# Patient Record
Sex: FEMALE | Race: WHITE | NOT HISPANIC OR LATINO | URBAN - METROPOLITAN AREA
[De-identification: names, ages, dates, MRNs, and addresses within clinical notes are randomized per-mention and may not be internally consistent; named-entity substitution may affect disease eponyms.]

---

## 2022-03-02 ENCOUNTER — EMERGENCY (EMERGENCY)
Facility: HOSPITAL | Age: 77
LOS: 1 days | Discharge: HOME | End: 2022-03-02
Attending: EMERGENCY MEDICINE | Admitting: EMERGENCY MEDICINE
Payer: MEDICARE

## 2022-03-02 VITALS
SYSTOLIC BLOOD PRESSURE: 208 MMHG | RESPIRATION RATE: 18 BRPM | HEART RATE: 86 BPM | DIASTOLIC BLOOD PRESSURE: 107 MMHG | OXYGEN SATURATION: 98 %

## 2022-03-02 VITALS
WEIGHT: 108.03 LBS | HEIGHT: 61.5 IN | SYSTOLIC BLOOD PRESSURE: 199 MMHG | HEART RATE: 83 BPM | OXYGEN SATURATION: 99 % | DIASTOLIC BLOOD PRESSURE: 98 MMHG | TEMPERATURE: 98 F | RESPIRATION RATE: 15 BRPM

## 2022-03-02 DIAGNOSIS — Z98.89 OTHER SPECIFIED POSTPROCEDURAL STATES: Chronic | ICD-10-CM

## 2022-03-02 PROCEDURE — 99284 EMERGENCY DEPT VISIT MOD MDM: CPT | Mod: 25

## 2022-03-02 PROCEDURE — 25605 CLTX DST RDL FX/EPHYS SEP W/: CPT | Mod: RT

## 2022-03-02 PROCEDURE — 73100 X-RAY EXAM OF WRIST: CPT

## 2022-03-02 PROCEDURE — 73100 X-RAY EXAM OF WRIST: CPT | Mod: 26

## 2022-03-02 PROCEDURE — 73100 X-RAY EXAM OF WRIST: CPT | Mod: 26,RT

## 2022-03-02 PROCEDURE — 99285 EMERGENCY DEPT VISIT HI MDM: CPT | Mod: 25

## 2022-03-02 RX ORDER — LIDOCAINE HCL 20 MG/ML
2 VIAL (ML) INJECTION ONCE
Refills: 0 | Status: DISCONTINUED | OUTPATIENT
Start: 2022-03-02 | End: 2022-03-06

## 2022-03-02 NOTE — ED ADULT NURSE NOTE - NSICDXPASTSURGICALHX_GEN_ALL_CORE_FT
PAST SURGICAL HISTORY:  H/O breast surgery left, biopsy    H/O breast surgery reduction , bilateral    H/O sinus surgery     H/O toe surgery bilateral gal

## 2022-03-02 NOTE — CONSULT NOTE ADULT - SUBJECTIVE AND OBJECTIVE BOX
Orthopaedic Surgery Consult Note    For Surgeon: Dr. Johnson    HPI:  Patient is a 76y old Female who presents with a chief complaint of R wrist pain after a ground level fall.  Pt denies any numbness/tingling, head trauma/LOC. Endorses moderate pain to her wrist. Able to wiggle her fingers. Pt had a L distal radius Fx several years ago which was treated by Dr. Johnson w/[facility].    Allergies    Demerol HCl (Nausea; Vomiting)  penicillin (Unknown)    Intolerances      PAST MEDICAL & SURGICAL HISTORY:  High cholesterol    H/O toe surgery  bilateral hammertoes    H/O breast surgery  left, biopsy    H/O breast surgery  reduction , bilateral    H/O sinus surgery      MEDICATIONS  (STANDING):  lidocaine 1% (Preservative-free) Injectable 2 milliLiter(s) Local Injection Once    MEDICATIONS  (PRN):      Vital Signs Last 24 Hrs  T(C): 36.6 (02 Mar 2022 16:33), Max: 36.6 (02 Mar 2022 16:33)  T(F): 97.9 (02 Mar 2022 16:33), Max: 97.9 (02 Mar 2022 16:33)  HR: 86 (02 Mar 2022 18:59) (83 - 86)  BP: 208/107 (02 Mar 2022 18:59) (199/98 - 208/107)  BP(mean): --  RR: 18 (02 Mar 2022 18:59) (15 - 18)  SpO2: 98% (02 Mar 2022 18:59) (98% - 99%)    Physical Exam:  Right upper extremity  Skin intact, +Swelling, +Ecchymosis  Decreased rom wrist 2/2 pain  Pulses intact, brisk cap refill  Sensation intact M/R/U  Motor intact AIN/PIN/U  Elbow NT full active rom w/o pain    Imaging:   Xray Right wrist shows intraarticular distal radius fracture with some impaction    Procedure: Using aseptic technique injected 10cc 1% lidocaine w/o epi into Right wrist performing hematoma block. Reduction performed, sugartong splint applied. Patient tolerated procedure well, neurovasc intact afterwards.    A/P: 76yFemale with Right distal radius fx  - NWB/sling  - Pain control  - ice/elevation  - Post splint xray reviewed, fracture well aligned   - F/u w/Dr. Johnson in office  - Discussed with Dr. Elizabeth Voss, PGY-2  Orthopedic Surgery  Ortho Pager [phone]

## 2022-03-02 NOTE — ED PROVIDER NOTE - CARE PROVIDER_API CALL
Charly Johnson)  Orthopaedic Surgery  [address]  [place], [place] [address]  Phone: [phone]  Fax: [phone]  Follow Up Time:

## 2022-03-02 NOTE — ED PROVIDER NOTE - CLINICAL SUMMARY MEDICAL DECISION MAKING FREE TEXT BOX
trip and fall with distal radius/ trip and fall with distal radius/ulnar styloid fracture. ortho consulted, performed reduction/splinting. to f/u with Dr. Johnson outpatient whom she has seen before. Declined pain medicine. return precautions discussed trip and fall with distal radius/ulnar styloid fracture. ortho consulted, performed reduction/splinting. to f/u with Dr. Johnson outpatient whom she has seen before. Declined pain medicine. return precautions discussed  bp very elevated at time of discharge. pt reports she has seen pmd and cardiologist about htn but not currently on meds. told to take metoprolol (given rx) by cards, and lisinopril by pmd but afraid of side effects, hasn't started. reports bp's normally 180's-200's in office with doctor. no signs of end organ damage now. discussed desire to give dose of medicine here but refused. says she will speak to her doctors again and consider filling prescriptions provided. understands risk of stroke, acs, kidney failure from uncontrolled htn and accepts.

## 2022-03-02 NOTE — ED PROVIDER NOTE - PATIENT PORTAL LINK FT
You can access the FollowMyHealth Patient Portal offered by [facility] by registering at the following website: [email or web address]. By joining [employer]’s FollowMyHealth portal, you will also be able to view your health information using other applications (apps) compatible with our system.

## 2022-03-02 NOTE — ED PROVIDER NOTE - OBJECTIVE STATEMENT
history of htn, here with pain in right wrist after trip and fall on sidewalk. No loc. Denies other injury. Went to urgent care, told she had distal radius fracture and needed to go to ED. Took tylenol for pain. Some discomfort in elbow with movements of wrist.

## 2022-03-02 NOTE — ED PROVIDER NOTE - NSFOLLOWUPINSTRUCTIONS_ED_ALL_ED_FT
Take tylenol 2 pills every 6 hours as needed for pain. Please see Dr. Johnson for followup.  Call for appointment.  If you have any problems with followup, please call the ED Referral Coordinator at [phone].  Return to the ER if symptoms worsen or other concerns.     WHAT YOU NEED TO KNOW:    A wrist fracture is a break in one or more of the bones in your wrist. Adult Arm Bones         DISCHARGE INSTRUCTIONS:    Return to the emergency department if:     Your pain gets worse or does not get better after you take pain medicine.      Your cast or splint breaks, gets wet, or is damaged.      Your hand or fingers feel numb or cold.      Your hand or fingers turn white or blue.      Your splint or cast feels too tight.      You have more pain or swelling after the cast or splint is put on.    Call your doctor if:     You have a fever.      There is a foul smell or blood coming from under the cast.      You have questions or concerns about your condition or care.    Medicines: You may need any of the following:     Prescription pain medicine may be given. Ask your healthcare provider how to take this medicine safely. Some prescription pain medicines contain acetaminophen. Do not take other medicines that contain acetaminophen without talking to your healthcare provider. Too much acetaminophen may cause liver damage. Prescription pain medicine may cause constipation. Ask your healthcare provider how to prevent or treat constipation.       NSAIDs, such as ibuprofen, help decrease swelling, pain, and fever. NSAIDs can cause stomach bleeding or kidney problems in certain people. If you take blood thinner medicine, always ask your healthcare provider if NSAIDs are safe for you. Always read the medicine label and follow directions.      Acetaminophen decreases pain and fever. It is available without a doctor's order. Ask how much to take and how often to take it. Follow directions. Read the labels of all other medicines you are using to see if they also contain acetaminophen, or ask your doctor or pharmacist. Acetaminophen can cause liver damage if not taken correctly. Do not use more than 4 grams (4,000 milligrams) total of acetaminophen in one day.       Take your medicine as directed. Contact your healthcare provider if you think your medicine is not helping or if you have side effects. Tell him or her if you are allergic to any medicine. Keep a list of the medicines, vitamins, and herbs you take. Include the amounts, and when and why you take them. Bring the list or the pill bottles to follow-up visits. Carry your medicine list with you in case of an emergency.    Self-care:     Rest as much as possible. Do not play contact sports until the healthcare provider says it is okay.      Apply ice on your wrist for 15 to 20 minutes every hour or as directed. Use an ice pack, or put crushed ice in a plastic bag. Cover it with a towel before you place it on your skin. Ice helps prevent tissue damage and decreases swelling and pain.      Elevate your wrist above the level of your heart as often as possible. This will help decrease swelling and pain. Prop your wrist on pillows or blankets to keep it elevated comfortably.    Cast or splint care:     You may take a bath or shower as directed. Do not let your cast or splint get wet. Before bathing, cover the cast or splint with 2 plastic trash bags. Tape the bags to your skin above the cast or splint to seal out the water. Keep your arm out of the water in case the bag breaks. If a plaster cast gets wet and soft, call your healthcare provider.      Check the skin around the cast or splint every day. You may put lotion on any red or sore areas.      Do not push down or lean on the cast or brace because it may break.      Do not scratch the skin under the cast by putting a sharp or pointed object inside the cast.    Go to physical therapy as directed: You may need physical therapy after your wrist heals and the cast is removed. A physical therapist can teach you exercises to help improve movement and strength and to decrease pain.    Follow up with your doctor or bone specialist as directed: You may need to return to have your cast removed. You may also need an x-ray to check how well the bone has healed. Write down your questions so you remember to ask them during your visits.

## 2022-03-02 NOTE — ED ADULT NURSE NOTE - OBJECTIVE STATEMENT
pt. a&ox4 ambulatory comes to ED from  after falling outside while walking, falling on her right side, injuring her right wrist. Pt. c/o pain most severe where wrist meets the hand @ radius. Pt. pulses strong on affected extremity. Wrapped in ace wrap and put in sling by  prior to arrival to ED. Pt. cap refill on affected extremity brisk <2 seconds, pt. denies numbness and tingling. Pt. denies blood thinner use. No visible deformity or open skin noted. pt. right side of body assessed for additional bruising or injury, nothing noted.

## 2022-03-02 NOTE — ED ADULT TRIAGE NOTE - MEANS OF ARRIVAL
Medical Necessity Information: It is in your best interest to select a reason for this procedure from the list below. All of these items fulfill various CMS LCD requirements except the new and changing color options. Post-Care Instructions: I reviewed with the patient in detail post-care instructions. Patient is to wear sunprotection, and avoid picking at any of the treated lesions. Pt may apply Vaseline to crusted or scabbing areas. Render Post-Care Instructions In Note?: no ambulatory Consent: The patient's consent was obtained including but not limited to risks of crusting, scabbing, blistering, scarring, darker or lighter pigmentary change, recurrence, incomplete removal and infection. Medical Necessity Clause: This procedure was medically necessary because the lesions that were treated were: Detail Level: Detailed

## 2022-03-02 NOTE — ED PROVIDER NOTE - CARE PLAN
1 Principal Discharge DX:	Closed fracture of distal end of right radius with ulna, initial encounter
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
## 2022-03-04 DIAGNOSIS — E78.00 PURE HYPERCHOLESTEROLEMIA, UNSPECIFIED: ICD-10-CM

## 2022-03-04 DIAGNOSIS — Z88.0 ALLERGY STATUS TO PENICILLIN: ICD-10-CM

## 2022-03-04 DIAGNOSIS — S52.571A OTHER INTRAARTICULAR FRACTURE OF LOWER END OF RIGHT RADIUS, INITIAL ENCOUNTER FOR CLOSED FRACTURE: ICD-10-CM

## 2022-03-04 DIAGNOSIS — I10 ESSENTIAL (PRIMARY) HYPERTENSION: ICD-10-CM

## 2022-03-04 DIAGNOSIS — Z88.5 ALLERGY STATUS TO NARCOTIC AGENT: ICD-10-CM

## 2022-03-04 DIAGNOSIS — S69.91XA UNSPECIFIED INJURY OF RIGHT WRIST, HAND AND FINGER(S), INITIAL ENCOUNTER: ICD-10-CM

## 2023-05-22 NOTE — ED ADULT TRIAGE NOTE - BP NONINVASIVE DIASTOLIC (MM HG)
98 Closure 2 Information: This tab is for additional flaps and grafts, including complex repair and grafts and complex repair and flaps. You can also specify a different location for the additional defect, if the location is the same you do not need to select a new one. We will insert the automated text for the repair you select below just as we do for solitary flaps and grafts. Please note that at this time if you select a location with a different insurance zone you will need to override the ICD10 and CPT if appropriate.